# Patient Record
Sex: FEMALE | URBAN - METROPOLITAN AREA
[De-identification: names, ages, dates, MRNs, and addresses within clinical notes are randomized per-mention and may not be internally consistent; named-entity substitution may affect disease eponyms.]

---

## 2024-09-18 ENCOUNTER — OFFICE VISIT (OUTPATIENT)
Dept: URGENT CARE | Age: 54
End: 2024-09-18
Payer: COMMERCIAL

## 2024-09-18 VITALS
DIASTOLIC BLOOD PRESSURE: 75 MMHG | RESPIRATION RATE: 18 BRPM | OXYGEN SATURATION: 97 % | SYSTOLIC BLOOD PRESSURE: 115 MMHG | WEIGHT: 189 LBS | HEART RATE: 71 BPM | TEMPERATURE: 97.7 F | BODY MASS INDEX: 32.27 KG/M2 | HEIGHT: 64 IN

## 2024-09-18 DIAGNOSIS — J02.9 SORE THROAT: ICD-10-CM

## 2024-09-18 DIAGNOSIS — H92.01 RIGHT EAR PAIN: ICD-10-CM

## 2024-09-18 DIAGNOSIS — K04.7 DENTAL ABSCESS: Primary | ICD-10-CM

## 2024-09-18 LAB
POC RAPID STREP: NEGATIVE
POC SARS-COV-2 AG BINAX: NORMAL

## 2024-09-18 RX ORDER — HALOPERIDOL 10 MG/1
10 TABLET ORAL DAILY
COMMUNITY

## 2024-09-18 RX ORDER — RISPERIDONE 4 MG/1
4 TABLET ORAL DAILY
COMMUNITY

## 2024-09-18 RX ORDER — AMOXICILLIN AND CLAVULANATE POTASSIUM 875; 125 MG/1; MG/1
1 TABLET, FILM COATED ORAL 2 TIMES DAILY
Qty: 20 TABLET | Refills: 0 | Status: SHIPPED | OUTPATIENT
Start: 2024-09-18 | End: 2024-09-28

## 2024-09-18 RX ORDER — DIVALPROEX SODIUM 500 MG/1
TABLET, FILM COATED, EXTENDED RELEASE ORAL
COMMUNITY

## 2024-09-18 RX ORDER — SERTRALINE HYDROCHLORIDE 100 MG/1
150 TABLET, FILM COATED ORAL
COMMUNITY
Start: 2023-02-03

## 2024-09-18 RX ORDER — IBUPROFEN 800 MG/1
800 TABLET ORAL 3 TIMES DAILY PRN
Qty: 30 TABLET | Refills: 0 | Status: SHIPPED | OUTPATIENT
Start: 2024-09-18

## 2024-09-18 ASSESSMENT — ENCOUNTER SYMPTOMS
GASTROINTESTINAL NEGATIVE: 1
FEVER: 0
COUGH: 1
SORE THROAT: 1
CHILLS: 0
SHORTNESS OF BREATH: 0

## 2024-09-18 NOTE — PATIENT INSTRUCTIONS
-I have prescribed you Augmentin, an antibiotic, to treat and/or prevent infection of your tooth.  -Pain from dental issues is very hard to control.   --Take 1000 mg of Tylenol every 8 hours scheduled for 24-48 hours   --Take 800 mg ibuprofen (with food!) every 6 hours scheduled for 24-48 hours   --This will reduce but not completely take away the pain:   -Dental abscesses can be serious. You can develop difficulty breathing from swelling of the throat. If your throat begins to swell or you have signs of systemic infection such as fevers, chills or weakness please go to the ER for evaluation.   -Your condition requires evaluation from a specialist. Please see your dentist as soon as possible.  -Go to the ER for worsening symptoms.

## 2024-09-18 NOTE — PROGRESS NOTES
"Subjective   Patient ID: Krystal Mckay is a 54 y.o. female. They present today with a chief complaint of Sore Throat, Cough, and Earache (Patient stated she has had a sore throat and earache for about a week and half.).    History of Present Illness  -endorses right ear pain, sore throat, and right jaw pain   -she has several broken teeth on the right lower jaw and has dental pain   -denies fever, chills, nausea, vomiting, CP, SOB  -denies allergies to antibiotics           Past Medical History  Allergies as of 09/18/2024    (No Known Allergies)       (Not in a hospital admission)       No past medical history on file.    No past surgical history on file.         Review of Systems  Review of Systems   Constitutional:  Negative for chills and fever.   HENT:  Positive for dental problem, ear pain and sore throat.    Respiratory:  Positive for cough. Negative for shortness of breath.    Cardiovascular:  Negative for chest pain.   Gastrointestinal: Negative.    All other systems reviewed and are negative.          Objective    Vitals:    09/18/24 1627   BP: 115/75   Pulse: 71   Resp: 18   Temp: 36.5 °C (97.7 °F)   TempSrc: Oral   SpO2: 97%   Weight: 85.7 kg (189 lb)   Height: 1.626 m (5' 4\")     No LMP recorded.    Physical Exam  Vitals reviewed.   Constitutional:       General: She is not in acute distress.  HENT:      Right Ear: Ear canal normal. No drainage or swelling. There is no impacted cerumen. No mastoid tenderness. Tympanic membrane is injected. Tympanic membrane is not perforated, retracted or bulging.      Left Ear: Tympanic membrane and ear canal normal.      Mouth/Throat:      Dentition: Dental tenderness, dental caries and dental abscesses present.      Pharynx: Uvula midline. Posterior oropharyngeal erythema and postnasal drip present. No pharyngeal swelling or uvula swelling.      Tonsils: No tonsillar exudate or tonsillar abscesses.     Neurological:      Mental Status: She is alert. "       Procedures    Point of Care Test & Imaging Results from this visit  Results for orders placed or performed in visit on 09/18/24   POCT rapid strep A manually resulted   Result Value Ref Range    POC Rapid Strep Negative Negative   POCT Covid-19 Rapid Antigen   Result Value Ref Range    POC SOPHIA-COV-2 AG  Presumptive negative test for SARS-CoV-2 (no antigen detected)     Presumptive negative test for SARS-CoV-2 (no antigen detected)      No results found.    Diagnostic study results (if any) were reviewed by Alison Kearns PA-C.    Assessment/Plan   Allergies, medications, history, and pertinent labs/EKGs/Imaging reviewed by Alison Kearns PA-C.     Medical Decision Making  COVID negative and strep negative.  She has several fractured/broken teeth on the lower right jaw, and suspect ear pain and sore throat 2/2 to a developing dental abscess. Will start Augmentin BID x 10 days, which would also adequately treat both otitis media and strep pharyngitis.  She was advised to follow up with dentist as soon as possible.  Advised Tylenol and NSAIDS PRN.  Rx for ibuprofen 800 mg provided.     At time of discharge patient was clinically well-appearing and HDS for outpatient management. The patient and/or family was educated regarding diagnosis, supportive care, OTC and Rx medications. The patient and/or family was given the opportunity to ask questions prior to discharge.  They verbalized understanding of my discussion of the plans for treatment, expected course, indications to return to  or seek further evaluation in ED, and the need for timely follow up as directed.      Orders and Diagnoses  Diagnoses and all orders for this visit:  Dental abscess  -     amoxicillin-pot clavulanate (Augmentin) 875-125 mg tablet; Take 1 tablet by mouth 2 times a day for 10 days.  -     ibuprofen 800 mg tablet; Take 1 tablet (800 mg) by mouth 3 times a day as needed for mild pain (1 - 3) or moderate pain (4 - 6) (pain).  Sore  throat  -     POCT rapid strep A manually resulted  -     POCT Covid-19 Rapid Antigen  Right ear pain      Medical Admin Record      Patient disposition: Home    Electronically signed by Alison Kearns PA-C  5:54 PM

## 2024-10-24 ENCOUNTER — OFFICE VISIT (OUTPATIENT)
Dept: URGENT CARE | Age: 54
End: 2024-10-24
Payer: COMMERCIAL

## 2024-10-24 ENCOUNTER — ANCILLARY PROCEDURE (OUTPATIENT)
Dept: URGENT CARE | Age: 54
End: 2024-10-24
Payer: COMMERCIAL

## 2024-10-24 VITALS
TEMPERATURE: 97.5 F | OXYGEN SATURATION: 96 % | DIASTOLIC BLOOD PRESSURE: 68 MMHG | HEIGHT: 64 IN | RESPIRATION RATE: 18 BRPM | SYSTOLIC BLOOD PRESSURE: 106 MMHG | WEIGHT: 185 LBS | BODY MASS INDEX: 31.58 KG/M2 | HEART RATE: 82 BPM

## 2024-10-24 DIAGNOSIS — S89.92XA INJURY OF LEFT KNEE, INITIAL ENCOUNTER: Primary | ICD-10-CM

## 2024-10-24 DIAGNOSIS — S80.02XA TRAUMATIC HEMATOMA OF LEFT KNEE, INITIAL ENCOUNTER: ICD-10-CM

## 2024-10-24 DIAGNOSIS — R60.9 SWELLING: ICD-10-CM

## 2024-10-24 NOTE — PROGRESS NOTES
"Subjective   Patient ID: Krystal Mckay is a 54 y.o. female. They present today with a chief complaint of Injury (LT knee).    History of Present Illness  Patient reports that she felt a pop along her left knee as walking down the steps  Doesn't believe her leg hyperextended, believes it was in a flexed position  Notes pain along the outside of her knee along with swelling  Reports that it is very difficult to ambulate    Past Medical History  Allergies as of 10/24/2024    (Not on File)       (Not in a hospital admission)       No past medical history on file.    No past surgical history on file.                                    Objective    Vitals:    10/24/24 1231   BP: 106/68   Pulse: 82   Resp: 18   Temp: 36.4 °C (97.5 °F)   TempSrc: Oral   SpO2: 96%   Weight: 83.9 kg (185 lb)   Height: 1.626 m (5' 4\")     No LMP recorded.    Physical Exam  Constitutional:       General: She is not in acute distress.     Appearance: Normal appearance. She is not toxic-appearing or diaphoretic.   HENT:      Nose: No rhinorrhea.   Eyes:      General: No scleral icterus.        Right eye: No discharge.         Left eye: No discharge.      Extraocular Movements: Extraocular movements intact.   Pulmonary:      Effort: Pulmonary effort is normal.   Musculoskeletal:      Cervical back: Normal range of motion.      Right knee:      Instability Tests: Anterior drawer test negative. Posterior drawer test negative.      Left knee:      Instability Tests: Anterior drawer test negative. Posterior drawer test negative.      Comments: LEFT LOWER EXT  Significant pain with left leg external rotation  Ttp along the lateral joint line  No significant ttp along the medial joint line  No warmth   No significant ttp along the posterior knee  No apparent pain with    Neurological:      Mental Status: She is alert.   Psychiatric:         Mood and Affect: Mood normal.         Behavior: Behavior normal.         Thought Content: Thought content " "normal.      Comments: Pleasant         Procedures    Point of Care Test & Imaging Results from this visit:  Results for orders placed or performed in visit on 09/18/24   POCT rapid strep A manually resulted    Collection Time: 09/18/24  4:32 PM   Result Value Ref Range    POC Rapid Strep Negative Negative   POCT Covid-19 Rapid Antigen    Collection Time: 09/18/24  4:41 PM   Result Value Ref Range    POC SOPHIA-COV-2 AG  Presumptive negative test for SARS-CoV-2 (no antigen detected)     Presumptive negative test for SARS-CoV-2 (no antigen detected)       Diagnostic study results (if any) were reviewed by Wyatt Cleveland MD.    Assessment/Plan   Allergies, medications, history, and pertinent labs/EKGs/Imaging reviewed by Wyatt Cleveland MD.     Medical Decision Making:    Patient's acute lateral knee pain following a pop while walking down the stairs is potentially 2/2 torn lateral mensicus. XR negative for fracture, however, it did reveal an oval hyperdensity posterior to the proximal tibia which could reflect a \"hematoma\". Encourage RICE, NSAIDs PRN, crutches until seen by orthopedics. Given injury clinic information    Orders and Diagnoses  Diagnoses and all orders for this visit:  Injury of left knee, initial encounter  -     XR knee left 3 views; Future  Traumatic hematoma of left knee, initial encounter      Patient disposition: Home      Medical Admin Record      Follow Up Instructions  No follow-ups on file.    Electronically signed by Wyatt Cleveland MD  1:36 PM        "

## 2024-10-24 NOTE — PATIENT INSTRUCTIONS
Your x-ray was negative for fracture, however, it did reveal a likely hematoma behind your knee  Please present to the injury clinic for expert evaluation and possible drainage if applicable   ICE, NSAIDs PRN

## 2024-10-25 ENCOUNTER — APPOINTMENT (OUTPATIENT)
Dept: ORTHOPEDIC SURGERY | Facility: CLINIC | Age: 54
End: 2024-10-25
Payer: COMMERCIAL

## 2024-10-25 ENCOUNTER — OFFICE VISIT (OUTPATIENT)
Dept: ORTHOPEDIC SURGERY | Facility: CLINIC | Age: 54
End: 2024-10-25
Payer: COMMERCIAL

## 2024-10-25 VITALS — BODY MASS INDEX: 31.58 KG/M2 | HEIGHT: 64 IN | WEIGHT: 185 LBS

## 2024-10-25 DIAGNOSIS — M17.12 PATELLOFEMORAL ARTHRITIS OF LEFT KNEE: Primary | ICD-10-CM

## 2024-10-25 DIAGNOSIS — M25.562 ACUTE PAIN OF LEFT KNEE: ICD-10-CM

## 2024-10-25 PROCEDURE — 99213 OFFICE O/P EST LOW 20 MIN: CPT

## 2024-10-25 PROCEDURE — 3008F BODY MASS INDEX DOCD: CPT

## 2024-10-25 PROCEDURE — 99203 OFFICE O/P NEW LOW 30 MIN: CPT

## 2024-10-25 RX ORDER — METHYLPREDNISOLONE 4 MG/1
TABLET ORAL
Qty: 21 TABLET | Refills: 0 | Status: SHIPPED | OUTPATIENT
Start: 2024-10-25

## 2024-10-25 NOTE — PROGRESS NOTES
Subjective    Patient ID: Krystal Mckay is a 54 y.o. female.    Chief Complaint: Pain of the Left Knee    HPI  This is a pleasant 54-year-old female presenting to the office for evaluation of left knee pain, which has been ongoing for months, worsening yesterday when she was walking down the stairs and heard a pop in her knee.  She states that she presented to an urgent care, or multiple view x-rays of her left knee were obtained, without evidence of acute fracture or dislocation.  There was noted to be a soft tissue hyperdensity to posterior tibia, which radiologist advised to correlate with physical exam.  Patient was advised to follow-up with orthopedics.  Presents to the office today stating that her knee does feel better.  She just has a minimal amount of pain, pointing directly to her kneecap.  Pain is worse with any bending or squatting activity.  She has noticed intermittent swelling and limited range of motion due to the pain.  Pain is definitely worse with prolonged standing walking and stair climbing.  Pain is described as a mild dull ache today.  She has not noticed any redness or warmth.  She is not having any mechanical symptoms of knee giving out or buckling.  She has been taking ibuprofen 800 mg and applying ice with minimal relief of symptoms.  Patient denies any posterior leg or calf pain.  She works doing food deliveries.    The patient's past medical, surgical, family, and social history as well as allergies and medications were reviewed and updated in the chart.    Objective   Ortho Exam  Pleasant and no acute distress. Walks with antalgic gait.  Left knee appearing without soft tissue swelling erythema or ecchymosis.  Slight patellofemoral crepitus noted with range of motion testing.  There is no warmth upon touch.  Left knee range of motion is 0-120 °. The knee is stable to varus and valgus stress Lachman and posterior drawer. There is a mild effusion. There is generalized tenderness  surrounding patella.  There is no palpable mass noted upon palpation of posterior knee or calf.  There is no tenderness upon palpation of posterior knee or calf.  No evidence of bruising to this area. Right knee range of motion is 0-120°. There is no effusion. The knee is stable to varus and valgus stress Lachman and posterior drawer. Both lower extremities are well perfused the skin is intact and muscle tone is adequate.    Image Results:  XR knee left 3 views  Narrative: Interpreted By:  Theron Carney,   STUDY:  XR KNEE LEFT 3 VIEWS;  10/24/2024 12:54 pm      INDICATION:  Signs/Symptoms:knee injury.      COMPARISON:  None.      ACCESSION NUMBER(S):  [id number]      ORDERING CLINICIAN:  MARCIE KING      FINDINGS:  No acute fracture or dislocation. Tiny osteophytes are present. There  is a rounded oval soft tissue hyperdensity posterior to the proximal  tibia measuring about 5 cm, only seen on the lateral view.      Impression: No acute osseous injury of the left knee.  Rounded oval hyperdensity posterior to the proximal tibia, which  could reflect hematoma. Suggest correlation with physical exam  findings.      MACRO:  None      Signed by: Theron Carney 10/24/2024 1:01 PM  Dictation workstation:   [id number]    Multiple view x-rays of the left knee obtained yesterday personally reviewed without evidence of acute fracture or dislocation.  My degenerative changes noted patellofemoral compartment with peripheral osteophyte formation noted.  Rounded oval hyperdensity posterior to the proximal tibia, which upon exam was nonsymptomatic.    Assessment/Plan   Encounter Diagnoses:  Patellofemoral arthritis of left knee    Acute pain of left knee    Plan: Discussion with patient in regards to aggravation of patellofemoral arthritis with review of conservative treatment options.  We did discuss a left knee intra-articular steroid injection of Kenalog/lidocaine, but patient deferred stating that she did not want an  injection.  We then discussed an oral Medrol Dosepak to help decrease pain inflammation, which patient agreed to.  She is aware not to take any other NSAIDs while Medrol Dosepak, but can supplement with Tylenol.  She was advised to continue with ice application and topical creams.  She was provided an economy hinged brace today in the office to help with compression and stability.  She can follow-up as needed.    Orders Placed This Encounter    methylPREDNISolone (Medrol Dospak) 4 mg tablets

## 2024-11-05 ENCOUNTER — APPOINTMENT (OUTPATIENT)
Dept: ORTHOPEDIC SURGERY | Facility: CLINIC | Age: 54
End: 2024-11-05
Payer: COMMERCIAL

## 2024-11-08 ENCOUNTER — TELEPHONE (OUTPATIENT)
Dept: ORTHOPEDIC SURGERY | Facility: CLINIC | Age: 54
End: 2024-11-08
Payer: COMMERCIAL

## 2024-11-08 DIAGNOSIS — M25.562 ACUTE PAIN OF LEFT KNEE: Primary | ICD-10-CM

## 2024-11-08 DIAGNOSIS — K04.7 DENTAL ABSCESS: ICD-10-CM

## 2024-11-08 RX ORDER — IBUPROFEN 800 MG/1
800 TABLET ORAL 3 TIMES DAILY PRN
Qty: 30 TABLET | Refills: 0 | Status: SHIPPED | OUTPATIENT
Start: 2024-11-08

## 2024-11-08 NOTE — TELEPHONE ENCOUNTER
Patient would like 800mg ibu sent to her pharmacy for pain         [id number] - [place], [place] - [address] AT [facility]  [address]  [facility] [address]  Phone: [phone] Fax: [phone]

## 2024-11-20 ENCOUNTER — APPOINTMENT (OUTPATIENT)
Dept: ORTHOPEDIC SURGERY | Facility: CLINIC | Age: 54
End: 2024-11-20
Payer: COMMERCIAL

## 2024-11-21 ENCOUNTER — APPOINTMENT (OUTPATIENT)
Dept: RADIOLOGY | Facility: HOSPITAL | Age: 54
End: 2024-11-21
Payer: COMMERCIAL

## 2024-11-21 ENCOUNTER — HOSPITAL ENCOUNTER (EMERGENCY)
Facility: HOSPITAL | Age: 54
Discharge: HOME | End: 2024-11-21
Payer: COMMERCIAL

## 2024-11-21 VITALS
OXYGEN SATURATION: 99 % | DIASTOLIC BLOOD PRESSURE: 59 MMHG | SYSTOLIC BLOOD PRESSURE: 128 MMHG | TEMPERATURE: 98.6 F | RESPIRATION RATE: 20 BRPM | HEART RATE: 70 BPM

## 2024-11-21 DIAGNOSIS — M25.562 ACUTE PAIN OF LEFT KNEE: Primary | ICD-10-CM

## 2024-11-21 PROCEDURE — 73564 X-RAY EXAM KNEE 4 OR MORE: CPT | Mod: LT

## 2024-11-21 PROCEDURE — 73564 X-RAY EXAM KNEE 4 OR MORE: CPT | Mod: LEFT SIDE | Performed by: RADIOLOGY

## 2024-11-21 PROCEDURE — 99283 EMERGENCY DEPT VISIT LOW MDM: CPT

## 2024-11-21 RX ORDER — IBUPROFEN 800 MG/1
800 TABLET ORAL EVERY 8 HOURS PRN
Qty: 15 TABLET | Refills: 0 | Status: SHIPPED | OUTPATIENT
Start: 2024-11-21 | End: 2024-11-26

## 2024-11-21 ASSESSMENT — COLUMBIA-SUICIDE SEVERITY RATING SCALE - C-SSRS
1. IN THE PAST MONTH, HAVE YOU WISHED YOU WERE DEAD OR WISHED YOU COULD GO TO SLEEP AND NOT WAKE UP?: NO
2. HAVE YOU ACTUALLY HAD ANY THOUGHTS OF KILLING YOURSELF?: NO
6. HAVE YOU EVER DONE ANYTHING, STARTED TO DO ANYTHING, OR PREPARED TO DO ANYTHING TO END YOUR LIFE?: NO

## 2024-11-21 NOTE — ED TRIAGE NOTES
Patient arrives from home with complaints of left knee pain that has been ongoing for about a month that occurred from walking down the steps and hearing a pop.  Patient states she was seen by an orthopedics and was supposed to have an appointment yesterday but missed it.

## 2024-11-21 NOTE — ED PROVIDER NOTES
Limitations to History: none  External Records Reviewed  Independent Historians: self  Social determinants affecting care: none    HPI  Krystal Mckay is a 54 y.o. female who presents to the emergency department for assessment of left knee pain for about a month.  She reports that she followed with Orthopedics for this and was told that she just had an injury and she was to rest and ice the leg.  She reports that pain started returning again to the left leg.  She made a new appointment with orthopedics however accidentally missed it yesterday.  Moving the knee makes her pain worse.  She currently has no pain if she took ibuprofen prior to the ER visit.  She has been using heat with relief as well as ibuprofen with relief.  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  She has not had new trauma to the leg.  She has not had fever or chills.  She has no further complaints.    PMH  No past medical history on file. reviewed by myself.    Meds  Current Outpatient Medications   Medication Instructions    divalproex (Depakote ER) 500 mg 24 hr tablet TAKE 2 TABLETS BY MOUTH NIGHTLY AT BEDTIME.    haloperidol (HALDOL) 10 mg, Daily    ibuprofen 800 mg, oral, 3 times daily PRN    methylPREDNISolone (Medrol Dospak) 4 mg tablets Use as directed by package instructions    risperiDONE (RISPERDAL) 4 mg, Daily    sertraline (ZOLOFT) 150 mg, Daily RT       Allergies  No Known Allergies reviewed by myself.    SHx  Social History     Tobacco Use    Smoking status: Unknown   Vaping Use    Vaping status: Never Used   Substance Use Topics    Drug use: Never    reviewed by myself.      ------------------------------------------------------------------------------------------------------------------------------------------    There were no vitals taken for this visit.    Physical Exam  Vitals and nursing note reviewed.   Constitutional:       Appearance: Normal appearance.  She is normal weight.   HENT:      Head: Atraumatic.      Nose: Nose normal.      Mouth/Throat:      Mouth: Mucous membranes are moist.   Eyes:      Extraocular Movements: Extraocular movements intact.      Conjunctiva/sclera: Conjunctivae normal.   Cardiovascular:      Rate and Rhythm: Normal rate and regular rhythm.   Pulmonary:      Effort: Pulmonary effort is normal.      Breath sounds: Normal breath sounds.   Musculoskeletal:      Cervical back: Neck supple.      Left hip: No deformity or bony tenderness. Normal range of motion.      Left knee: No swelling, erythema, ecchymosis or bony tenderness. Normal range of motion. Normal pulse.      Left ankle: Normal.      Left foot: Normal. Normal capillary refill. Normal pulse.      Comments:  Compartments are soft and compressible to the left lower extremity   Skin:     General: Skin is warm and dry.      Capillary Refill: Capillary refill takes less than 2 seconds.   Neurological:      Mental Status: She is alert and oriented to person, place, and time.          ------------------------------------------------------------------------------------------------------------------------------------------  Labs  Labs Reviewed - No data to display     Imaging  XR knee left 4+ views   Final Result   No acute fracture or dislocation.        Small suprapatellar effusion.        Spurring of the patella and tibial spines        MACRO:   None        Signed by: Ashley Campos 11/21/2024 8:58 AM   Dictation workstation:   [id number]           ED Course  Diagnoses as of 11/21/24 0918   Acute pain of left knee        Medical Decision Making: She did not appear ill or toxic.  Vital signs reviewed and stable.  Will repeat x-ray imaging.  She currently does not have any pain due to taking ibuprofen prior to ER visit.  Her left lower extremity is neurovascularly intact.  Her compartments are soft and compressible.     Differential diagnoses considered:  Knee contusion, knee sprain, knee  strain, joint effusion, others    X-ray of the knee showing no acute fracture or dislocation.  There is a small suprapatellar effusion but otherwise unremarkable.  I discussed with the patient about the imaging.  I recommend she continue taking the ibuprofen with food to help with her pain.  She is to keep her orthopedic appointment for next week and follow-up.  I recommend she rest, ice, elevate the leg.  She reports that she has been using heat and this does seem to help so she will continue with this.  She is to return to the ER immediately if symptoms change or worsen.  She verbalized understanding and agreed to plan of care.  She was discharged home in stable condition.    Diagnosis: left knee pain  Plan: discharge     Melecio Watkins PA-C  11/21/24 0918

## 2024-11-21 NOTE — DISCHARGE INSTRUCTIONS
Please follow-up with orthopedics as scheduled next week.  Rest, ice, elevate your leg due to injury.  Tylenol and ibuprofen for pain control.  Return to ER if symptoms change or worsen.

## 2024-11-27 ENCOUNTER — OFFICE VISIT (OUTPATIENT)
Dept: ORTHOPEDIC SURGERY | Facility: CLINIC | Age: 54
End: 2024-11-27
Payer: COMMERCIAL

## 2024-11-27 ENCOUNTER — HOSPITAL ENCOUNTER (OUTPATIENT)
Dept: RADIOLOGY | Facility: EXTERNAL LOCATION | Age: 54
Discharge: HOME | End: 2024-11-27

## 2024-11-27 DIAGNOSIS — M25.562 ACUTE PAIN OF LEFT KNEE: ICD-10-CM

## 2024-11-27 DIAGNOSIS — M17.12 PATELLOFEMORAL ARTHRITIS OF LEFT KNEE: Primary | ICD-10-CM

## 2024-11-27 DIAGNOSIS — M25.462 EFFUSION OF LEFT KNEE: ICD-10-CM

## 2024-11-27 PROCEDURE — 2500000004 HC RX 250 GENERAL PHARMACY W/ HCPCS (ALT 636 FOR OP/ED): Performed by: FAMILY MEDICINE

## 2024-11-27 PROCEDURE — 99214 OFFICE O/P EST MOD 30 MIN: CPT | Performed by: FAMILY MEDICINE

## 2024-11-27 PROCEDURE — 20611 DRAIN/INJ JOINT/BURSA W/US: CPT | Mod: LT | Performed by: FAMILY MEDICINE

## 2024-11-27 RX ORDER — LIDOCAINE HYDROCHLORIDE 20 MG/ML
5 INJECTION, SOLUTION INFILTRATION; PERINEURAL
Status: COMPLETED | OUTPATIENT
Start: 2024-11-27 | End: 2024-11-27

## 2024-11-27 RX ORDER — TRIAMCINOLONE ACETONIDE 40 MG/ML
40 INJECTION, SUSPENSION INTRA-ARTICULAR; INTRAMUSCULAR
Status: COMPLETED | OUTPATIENT
Start: 2024-11-27 | End: 2024-11-27

## 2024-11-27 NOTE — PROGRESS NOTES
History of Present Illness   Chief Complaint   Patient presents with    Left Knee - Follow-up       The patient is 54 y.o. female with past medical history of bipolar schizophrenia here for follow-up of left knee pain.  Patient was seen by my [facility] colleague, Thi Nguyen.  In brief patient been dealing with some more mild intermittent knee pain for a few months however last month was walking down some stairs when she felt a popping sensation in her knee.  At that time she was having some focal pain over the anterior aspect of her knee, symptoms thought to be secondary to aggravation of underlying mild patellofemoral osteoarthritis which was seen on x-ray.  Patient preferred conservative management, she was given some oral prednisone and a knee stabilizing brace.  She had ongoing pain despite conservative management, she was seen in the emergency department last week, they recommended outpatient orthopedic follow-up, repeat x-rays at that time showed no new injuries but did show small joint effusion.  She has pain with walking, she feels like brace is making symptoms more problematic.  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  She has a hard time going up and down stairs.  She has been taking ibuprofen 800 mg as needed for pain, on average a few times a week.  She denies a history of any significant knee problems in the past.      No past medical history on file.    Medication Documentation Review Audit       Reviewed by VENKAT Caban (Nurse Practitioner) on 10/25/24 at 1335      Medication Order Taking? Sig Documenting Provider Last Dose Status   divalproex (Depakote ER) 500 mg 24 hr tablet [id number] Yes TAKE 2 TABLETS BY MOUTH NIGHTLY AT BEDTIME. Historical Provider, MD Taking Active   haloperidol (Haldol) 10 mg tablet [id number] Yes Take 1 tablet (10 mg) by mouth once daily. Historical Provider, MD Taking Active   ibuprofen 800 mg tablet [id number]  Take 1 tablet  (800 mg) by mouth 3 times a day as needed for mild pain (1 - 3) or moderate pain (4 - 6) (pain). Alison Kearns PA-C  Active   methylPREDNISolone (Medrol Dospak) 4 mg tablets [id number]  Use as directed by package instructions Thi Nguyen, APRN-CNP  Active   risperiDONE (RisperDAL) 4 mg tablet [id number] Yes Take 1 tablet (4 mg) by mouth once daily. Historical Provider, MD Taking Active   sertraline (Zoloft) 100 mg tablet [id number] Yes Take 1.5 tablets (150 mg) by mouth once daily. Historical Provider, MD Taking Active                    No Known Allergies    Social History     Socioeconomic History    Marital status: Unknown     Spouse name: Not on file    Number of children: Not on file    Years of education: Not on file    Highest education level: Not on file   Occupational History    Not on file   Tobacco Use    Smoking status: Unknown    Smokeless tobacco: Not on file   Vaping Use    Vaping status: Never Used   Substance and Sexual Activity    Alcohol use: Not on file    Drug use: Never    Sexual activity: Not on file   Other Topics Concern    Not on file   Social History Narrative    Not on file     Social Drivers of Health     Financial Resource Strain: Not on File (8/19/2019)    Received from [facility]    Financial Resource Strain     Financial Resource Strain: 0   Food Insecurity: Not on File (9/26/2024)    Received from [facility]    Food Insecurity     Food: 0   Transportation Needs: Not on File (8/19/2019)    Received from [facility]    Transportation Needs     Transportation: 0   Physical Activity: Not on File (8/19/2019)    Received from [facility]    Physical Activity     Physical Activity: 0   Stress: Not on File (8/19/2019)    Received from [facility]    Stress     Stress: 0   Social Connections: Not on File (9/13/2024)    Received from [facility]    Social Connections     Connectedness: 0   Intimate Partner Violence: Not on file   Housing Stability: Not on File (8/19/2019)    Received from [facility]    Housing Stability      Housin       No past surgical history on file.       Review of Systems   GENERAL: Negative  GI: Negative  MUSCULOSKELETAL: See HPI  SKIN: Negative  NEURO:  Negative     Physical Exam:    General/Constitutional: well appearing, no distress, appears stated age  HEENT: sclera clear  Respiratory: non labored breathing  Vascular: No edema, swelling or tenderness, except as noted in detailed exam.  Integumentary: No impressive skin lesions present, except as noted in detailed exam.  Neurological:  Alert and oriented   Psychological:  Normal mood and affect.  Musculoskeletal: Normal, except as noted in detailed exam and in HPI mild antalgic gait, unassisted    Left knee: Normal appearance, no swelling, no redness warmth.  There is a moderate joint effusion.  She does have some medial and lateral joint line tenderness to palpation, medial greater than lateral.  Range of motion from 3 to 115 degrees with pain at end range of flexion.  No motor deficits present at the knee.  There is some pain with Mirtha testing.  Negative Lachman, negative posterior drawer.  Stable to varus and valgus stress.       Imaging: No new imaging today, previous x-ray showed some mild patellofemoral osteoarthritis and small joint effusion.    L Inj/Asp: L knee on 2024 1:53 PM  Indications: pain  Details: 22 G needle, ultrasound-guided superolateral approach  Medications: 40 mg triamcinolone acetonide 40 mg/mL; 5 mL lidocaine 20 mg/mL (2 %)  Aspirate: 45 mL clear and yellow  Outcome: tolerated well, no immediate complications  Procedure, treatment alternatives, risks and benefits explained, specific risks discussed. Consent was given by the patient. Immediately prior to procedure a time out was called to verify the correct patient, procedure, equipment, support staff and site/side marked as required. Patient was prepped and draped in the usual sterile fashion.               Assessment   1. Patellofemoral arthritis of left knee  Point of  Care Ultrasound      2. Acute pain of left knee        3. Effusion of left knee              Plan: Subacute left knee pain, does have notable joint effusion on exam today.  Differential includes aggravation of underlying osteoarthritis, did have popping sensation walking down some stairs, there is some medial joint line tenderness, discussed possibility of medial meniscus tear as cause of her symptoms.  Discussed further workup and treatment with patient.  We did proceed with ultrasound-guided left knee aspiration followed by Kenalog injection, 45 cc of normal-appearing joint fluid was aspirated prior to injection.  Patient tolerated without issue, see procedure note for full details, she did admit to improvement in symptoms following procedure.  I would like to see her back in 1 month for reassessment.  We discussed if she has any recurrent or ongoing pain could consider MRI for further evaluation of possible medial meniscus tear.

## 2024-12-10 ENCOUNTER — TELEPHONE (OUTPATIENT)
Dept: ORTHOPEDIC SURGERY | Facility: CLINIC | Age: 54
End: 2024-12-10
Payer: COMMERCIAL

## 2024-12-10 DIAGNOSIS — M17.12 PATELLOFEMORAL ARTHRITIS OF LEFT KNEE: Primary | ICD-10-CM

## 2024-12-10 NOTE — TELEPHONE ENCOUNTER
Patient calling in regarding her knee pain, states that it was feeling okay while the weather was nice but now its really painful again.   Wants to know if there is a brace that would keep her knee WARM as its too cold and she is unable to work.     Also wanting to know if she should be doing physical therapy or what the next steps are.

## 2024-12-10 NOTE — TELEPHONE ENCOUNTER
I put an order for physical therapy into the chart, if she plans to do at  order is there.  If she wants to do PT outside of  we can fax to location she would like to go.  No specific brace that I would recommend specifically for warmth.  She can use any over-the-counter brace that she feels is supportive for her knee.

## 2025-01-06 ENCOUNTER — APPOINTMENT (OUTPATIENT)
Dept: PHYSICAL THERAPY | Facility: CLINIC | Age: 55
End: 2025-01-06
Payer: COMMERCIAL

## 2025-01-08 ENCOUNTER — OFFICE VISIT (OUTPATIENT)
Dept: ORTHOPEDIC SURGERY | Facility: CLINIC | Age: 55
End: 2025-01-08
Payer: COMMERCIAL

## 2025-01-08 VITALS — HEIGHT: 64 IN | WEIGHT: 187 LBS | BODY MASS INDEX: 31.92 KG/M2

## 2025-01-08 DIAGNOSIS — M25.562 ACUTE PAIN OF LEFT KNEE: ICD-10-CM

## 2025-01-08 DIAGNOSIS — M17.12 PATELLOFEMORAL ARTHRITIS OF LEFT KNEE: ICD-10-CM

## 2025-01-08 PROCEDURE — 99214 OFFICE O/P EST MOD 30 MIN: CPT | Performed by: FAMILY MEDICINE

## 2025-01-08 PROCEDURE — 3008F BODY MASS INDEX DOCD: CPT | Performed by: FAMILY MEDICINE

## 2025-01-08 NOTE — PROGRESS NOTES
History of Present Illness   Chief Complaint   Patient presents with    Left Knee - Follow-up       The patient is 54 y.o. female with past medical history of bipolar schizophrenia here for follow-up of left knee pain.  Patient was seen by me initially 6 weeks ago, see previous note for full details.  In brief patient had been dealing with some intermittent knee pain over the past several months.  Did see my nurse practitioner colleague, was given a knee brace, some oral medications and a home exercise program that she had been doing.  She had some ongoing pain and swelling prompting her to follow up in the office with me.  At that time there was a notable joint effusion, x-rays prior to that showed some mild degenerative changes, she was treated with knee aspiration followed by Kenalog injection.  Patient says that she did feel good for around 1 to 2 weeks but then had recurrence of pain, was having some difficulty walking/bearing weight, says that she has been resting for the past 2 weeks with limited walking and has seen some mild improvement but still having pain, admits to some feelings of instability with knee giving out as well as some locking of the knee.  She feels like there is some recurrent swelling in the knee.  She continues to have difficulty walking up stairs.  She is taking over-the-counter ibuprofen as needed for pain.        No past medical history on file.    Medication Documentation Review Audit       Reviewed by Álvaro Choe MD (Physician) on 11/27/24 at 1354      Medication Order Taking? Sig Documenting Provider Last Dose Status   divalproex (Depakote ER) 500 mg 24 hr tablet [id number] No TAKE 2 TABLETS BY MOUTH NIGHTLY AT BEDTIME. Historical Provider, MD Taking Active   haloperidol (Haldol) 10 mg tablet [id number] No Take 1 tablet (10 mg) by mouth once daily. Historical Provider, MD Taking Active     Discontinued 11/21/24 0944   ibuprofen 800 mg tablet [id number]  Take 1 tablet (800 mg) by  mouth every 8 hours if needed for mild pain (1 - 3) for up to 5 days. Melecio Watkins PA-C   24 [id number]   methylPREDNISolone (Medrol Dospak) 4 mg tablets [id number]  Use as directed by package instructions Thi Nguyen, APRN-CNP  Active   risperiDONE (RisperDAL) 4 mg tablet [id number] No Take 1 tablet (4 mg) by mouth once daily. Historical Provider, MD Taking Active   sertraline (Zoloft) 100 mg tablet [id number] No Take 1.5 tablets (150 mg) by mouth once daily. Historical Provider, MD Taking Active                    Allergies   Allergen Reactions    Nickel Rash       Social History     Socioeconomic History    Marital status: Unknown     Spouse name: Not on file    Number of children: Not on file    Years of education: Not on file    Highest education level: Not on file   Occupational History    Not on file   Tobacco Use    Smoking status: Unknown    Smokeless tobacco: Not on file   Vaping Use    Vaping status: Never Used   Substance and Sexual Activity    Alcohol use: Never    Drug use: Never    Sexual activity: Not on file   Other Topics Concern    Not on file   Social History Narrative    Not on file     Social Drivers of Health     Financial Resource Strain: Not on File (2019)    Received from [facility]    Financial Resource Strain     Financial Resource Strain: 0   Food Insecurity: Not on File (2024)    Received from [facility]    Food Insecurity     Food: 0   Transportation Needs: Not on File (2019)    Received from [facility]    Transportation Needs     Transportation: 0   Physical Activity: Not on File (2019)    Received from [facility]    Physical Activity     Physical Activity: 0   Stress: Not on File (2019)    Received from [facility]    Stress     Stress: 0   Social Connections: Not on File (2024)    Received from [facility]    Social Connections     Connectedness: 0   Intimate Partner Violence: Not on file   Housing Stability: Not on File (2019)    Received from [facility]    Housing  Stability     Housin       No past surgical history on file.       Review of Systems   GENERAL: Negative  GI: Negative  MUSCULOSKELETAL: See HPI  SKIN: Negative  NEURO:  Negative     Physical Exam:    General/Constitutional: well appearing, no distress, appears stated age  HEENT: sclera clear  Respiratory: non labored breathing  Vascular: No edema, swelling or tenderness, except as noted in detailed exam.  Integumentary: No impressive skin lesions present, except as noted in detailed exam.  Neurological:  Alert and oriented   Psychological:  Normal mood and affect.  Musculoskeletal: Normal, except as noted in detailed exam and in HPI mild antalgic gait, unassisted    Left knee: Normal appearance, no swelling, no redness warmth.  There is a trace to small joint effusion.  She does have some medial and lateral joint line tenderness to palpation, medial greater than lateral.  Range of motion from 3 to 115 degrees with pain at end range of flexion.  No motor deficits present at the knee.  There is some pain with Mirtha testing.  Negative Lachman, negative posterior drawer.  Stable to varus and valgus stress.       Imaging: No new imaging today, previous x-ray showed some mild patellofemoral osteoarthritis and small joint effusion.          Assessment   1. Acute pain of left knee  MR knee left wo IV contrast      2. Patellofemoral arthritis of left knee  MR knee left wo IV contrast              Plan: Subacute left knee pain, recurrent joint effusion.  X-ray showed to minimal degenerative changes, is complaining of some instability and locking, there is concern for possible medial meniscus tear as cause of symptoms.  She has had prior aspiration, injection, bracing, over-the-counter, prescription medications as well as home exercise rehabilitation with ongoing symptoms.  Given this, specifically with her mechanical symptoms I would like to obtain an MRI of her left knee to evaluate for possible medial meniscus tear,  other derangement that may benefit from possible surgical intervention.  In the meantime she will continue with conservative management.  Further treatment pending MRI results.

## 2025-01-15 ENCOUNTER — EVALUATION (OUTPATIENT)
Dept: PHYSICAL THERAPY | Facility: CLINIC | Age: 55
End: 2025-01-15
Payer: COMMERCIAL

## 2025-01-15 ENCOUNTER — OFFICE VISIT (OUTPATIENT)
Dept: ORTHOPEDIC SURGERY | Facility: CLINIC | Age: 55
End: 2025-01-15
Payer: COMMERCIAL

## 2025-01-15 ENCOUNTER — HOSPITAL ENCOUNTER (OUTPATIENT)
Dept: RADIOLOGY | Facility: CLINIC | Age: 55
Discharge: HOME | End: 2025-01-15
Payer: COMMERCIAL

## 2025-01-15 DIAGNOSIS — M25.532 LEFT WRIST PAIN: ICD-10-CM

## 2025-01-15 DIAGNOSIS — M17.12 PATELLOFEMORAL ARTHRITIS OF LEFT KNEE: ICD-10-CM

## 2025-01-15 DIAGNOSIS — M77.8 LEFT WRIST TENDINITIS: Primary | ICD-10-CM

## 2025-01-15 PROCEDURE — 97161 PT EVAL LOW COMPLEX 20 MIN: CPT | Mod: GP

## 2025-01-15 PROCEDURE — 73100 X-RAY EXAM OF WRIST: CPT | Mod: LEFT SIDE | Performed by: RADIOLOGY

## 2025-01-15 PROCEDURE — 99213 OFFICE O/P EST LOW 20 MIN: CPT | Performed by: FAMILY MEDICINE

## 2025-01-15 PROCEDURE — 73100 X-RAY EXAM OF WRIST: CPT | Mod: LT

## 2025-01-15 PROCEDURE — 97110 THERAPEUTIC EXERCISES: CPT | Mod: GP

## 2025-01-15 ASSESSMENT — PATIENT HEALTH QUESTIONNAIRE - PHQ9
1. LITTLE INTEREST OR PLEASURE IN DOING THINGS: NOT AT ALL
2. FEELING DOWN, DEPRESSED OR HOPELESS: NOT AT ALL
SUM OF ALL RESPONSES TO PHQ9 QUESTIONS 1 AND 2: 0

## 2025-01-15 ASSESSMENT — COLUMBIA-SUICIDE SEVERITY RATING SCALE - C-SSRS
1. IN THE PAST MONTH, HAVE YOU WISHED YOU WERE DEAD OR WISHED YOU COULD GO TO SLEEP AND NOT WAKE UP?: NO
6. HAVE YOU EVER DONE ANYTHING, STARTED TO DO ANYTHING, OR PREPARED TO DO ANYTHING TO END YOUR LIFE?: NO
2. HAVE YOU ACTUALLY HAD ANY THOUGHTS OF KILLING YOURSELF?: NO

## 2025-01-15 NOTE — PROGRESS NOTES
History of Present Illness   Chief Complaint   Patient presents with    Left Wrist - Pain     OPNP L WRIST PAIN X 3/4 WEEKS NKI       The patient is 54 y.o. right-hand-dominant female  here with a complaint of left wrist pain.  Onset of symptoms over the past 3 weeks or so, atraumatic in nature.  She points to the dorsal aspect of her wrist as area of discomfort, she has some pain with wrist motion, pushing herself up from a seated position.  She denies any swelling, bruising, skin changes.  No specific treatments for her wrist, she denies any history of any significant wrist problems in the past.  She denies any numbness or tingling.    History reviewed. No pertinent past medical history.    Medication Documentation Review Audit       Reviewed by Nba Castillo MA (Medical Assistant) on 01/15/25 at 1503      Medication Order Taking? Sig Documenting Provider Last Dose Status   divalproex (Depakote ER) 500 mg 24 hr tablet [id number] No TAKE 2 TABLETS BY MOUTH NIGHTLY AT BEDTIME. Historical Provider, MD Taking Active   haloperidol (Haldol) 10 mg tablet [id number] No Take 1 tablet (10 mg) by mouth once daily. Historical Provider, MD Taking Active   methylPREDNISolone (Medrol Dospak) 4 mg tablets [id number]  Use as directed by package instructions Thi Juarezec, APRN-CNP  Active   risperiDONE (RisperDAL) 4 mg tablet [id number] No Take 1 tablet (4 mg) by mouth once daily. Historical Provider, MD Taking Active   sertraline (Zoloft) 100 mg tablet [id number] No Take 1.5 tablets (150 mg) by mouth once daily. Historical Provider, MD Taking Active                    Allergies   Allergen Reactions    Nickel Rash       Social History     Socioeconomic History    Marital status:      Spouse name: Not on file    Number of children: Not on file    Years of education: Not on file    Highest education level: Not on file   Occupational History    Not on file   Tobacco Use    Smoking status: Unknown    Smokeless  tobacco: Not on file   Vaping Use    Vaping status: Never Used   Substance and Sexual Activity    Alcohol use: Never    Drug use: Never    Sexual activity: Not on file   Other Topics Concern    Not on file   Social History Narrative    Not on file     Social Drivers of Health     Financial Resource Strain: Not on File (2019)    Received from [facility] (acquired by [facility] in 2014)    Financial Resource Strain     Financial Resource Strain: 0   Food Insecurity: Not on File (2024)    Received from [facility] (acquired by [facility] in 2014)    Food Insecurity     Food: 0   Transportation Needs: Not on File (2019)    Received from [facility] (acquired by [facility] in 2014)    Transportation Needs     Transportation: 0   Physical Activity: Not on File (2019)    Received from [facility] (acquired by [facility] in 2014)    Physical Activity     Physical Activity: 0   Stress: Not on File (2019)    Received from [facility] (acquired by [facility] in 2014)    Stress     Stress: 0   Social Connections: Not on File (2024)    Received from [facility] (acquired by [facility] in 2014)    Social Connections     Connectedness: 0   Intimate Partner Violence: Not on file   Housing Stability: Not on File (2019)    Received from [facility] (acquired by [facility] in 2014)    Housing Stability     Housin       History reviewed. No pertinent surgical history.       Review of Systems   GENERAL: Negative  GI: Negative  MUSCULOSKELETAL: See HPI  SKIN: Negative  NEURO:  Negative     Physical Exam:    General/Constitutional: well appearing, no distress, appears stated age  HEENT: sclera clear  Respiratory: non labored breathing  Vascular: No edema, swelling or tenderness, except as noted in detailed exam.  Integumentary: No impressive skin lesions present, except as noted in detailed exam.  Neurological:  Alert and oriented   Psychological:  Normal mood and affect.  Musculoskeletal: Normal, except as noted in detailed exam and in HPI    Left wrist: Normal appearance, no swelling, no redness or warmth, no other skin changes.  There is some mild tenderness at the dorsum of the wrist near the radiocarpal joint, no other areas of tenderness to palpation.  She has  full range of motion at the wrist, she admits to pain at endrange of flexion and extension.  There is pain but no weakness with resisted wrist extension.  No other motor deficits are present at the wrist.  No instability at the DRUJ, at the hand there is no motor or sensory deficits of the median, ulnar, radial nerve distributions.       Imaging: X-rays of left wrist obtained today and independently reviewed.  No acute abnormalities are seen, no significant degenerative changes are present      Assessment   1. Left wrist tendinitis  Wrist brace      2. Left wrist pain  XR wrist left 1-2 views            Plan: Left wrist pain consistent with mild left wrist tendinitis of the extensor compartment.  Recommending conservative management, she was fitted for a Futuro wrist brace today, she can wear for comfort as needed over the next few weeks, she can wean out of brace as symptoms allow, she can use over-the-counter medications as needed.  She will follow-up as symptoms dictate.
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
## 2025-01-22 ENCOUNTER — APPOINTMENT (OUTPATIENT)
Dept: PHYSICAL THERAPY | Facility: CLINIC | Age: 55
End: 2025-01-22
Payer: COMMERCIAL

## 2025-01-29 ENCOUNTER — APPOINTMENT (OUTPATIENT)
Dept: ORTHOPEDIC SURGERY | Facility: CLINIC | Age: 55
End: 2025-01-29
Payer: COMMERCIAL

## 2025-01-29 ENCOUNTER — APPOINTMENT (OUTPATIENT)
Dept: PHYSICAL THERAPY | Facility: CLINIC | Age: 55
End: 2025-01-29
Payer: COMMERCIAL

## 2025-01-31 ENCOUNTER — APPOINTMENT (OUTPATIENT)
Dept: RADIOLOGY | Facility: CLINIC | Age: 55
End: 2025-01-31
Payer: COMMERCIAL

## 2025-02-03 ENCOUNTER — DOCUMENTATION (OUTPATIENT)
Dept: PHYSICAL THERAPY | Facility: CLINIC | Age: 55
End: 2025-02-03
Payer: COMMERCIAL

## 2025-02-03 NOTE — PROGRESS NOTES
Physical Therapy                 Therapy Communication Note    Patient Name: Krystal Mckay  MRN: [id number]  Department:   Room: Room/bed info not found  Today's Date: 2/3/2025     Discipline: Physical Therapy          Missed Visit Reason:      Missed Time: Cancel    Comment: Pt unable to attend PT due to cost. Will cont with HEP

## 2025-02-05 ENCOUNTER — APPOINTMENT (OUTPATIENT)
Dept: PHYSICAL THERAPY | Facility: CLINIC | Age: 55
End: 2025-02-05
Payer: COMMERCIAL

## 2025-02-11 ENCOUNTER — APPOINTMENT (OUTPATIENT)
Dept: RADIOLOGY | Facility: HOSPITAL | Age: 55
End: 2025-02-11
Payer: COMMERCIAL

## 2025-02-12 ENCOUNTER — APPOINTMENT (OUTPATIENT)
Dept: PHYSICAL THERAPY | Facility: CLINIC | Age: 55
End: 2025-02-12
Payer: COMMERCIAL

## 2025-02-17 ENCOUNTER — HOSPITAL ENCOUNTER (OUTPATIENT)
Dept: RADIOLOGY | Facility: HOSPITAL | Age: 55
Discharge: HOME | End: 2025-02-17
Payer: COMMERCIAL

## 2025-02-17 DIAGNOSIS — M17.12 PATELLOFEMORAL ARTHRITIS OF LEFT KNEE: ICD-10-CM

## 2025-02-17 DIAGNOSIS — M25.562 ACUTE PAIN OF LEFT KNEE: ICD-10-CM

## 2025-02-17 PROCEDURE — 73721 MRI JNT OF LWR EXTRE W/O DYE: CPT | Mod: LEFT SIDE | Performed by: RADIOLOGY

## 2025-02-17 PROCEDURE — 73721 MRI JNT OF LWR EXTRE W/O DYE: CPT | Mod: LT

## 2025-02-19 ENCOUNTER — OFFICE VISIT (OUTPATIENT)
Dept: ORTHOPEDIC SURGERY | Facility: CLINIC | Age: 55
End: 2025-02-19
Payer: COMMERCIAL

## 2025-02-19 ENCOUNTER — APPOINTMENT (OUTPATIENT)
Dept: PHYSICAL THERAPY | Facility: CLINIC | Age: 55
End: 2025-02-19
Payer: COMMERCIAL

## 2025-02-19 DIAGNOSIS — M17.12 ARTHRITIS OF LEFT KNEE: ICD-10-CM

## 2025-02-19 DIAGNOSIS — M84.452D: Primary | ICD-10-CM

## 2025-02-19 PROCEDURE — 99214 OFFICE O/P EST MOD 30 MIN: CPT | Performed by: FAMILY MEDICINE

## 2025-02-19 RX ORDER — MELOXICAM 15 MG/1
15 TABLET ORAL DAILY
Qty: 30 TABLET | Refills: 0 | Status: SHIPPED | OUTPATIENT
Start: 2025-02-19 | End: 2025-03-21

## 2025-02-19 NOTE — PROGRESS NOTES
History of Present Illness   Chief Complaint   Patient presents with    Left Knee - Follow-up     MRI DISCUSSION       The patient is 54 y.o. female with past medical history of bipolar schizophrenia here for follow-up of left knee pain/MRI review.  Patient was last seen by me 6 weeks ago, see previous note for full details.  In brief patient had been dealing with some intermittent knee pain over the past several months.  Did see my nurse practitioner colleague, was given a knee brace, some oral medications and a home exercise program that she had been doing.  She had some ongoing pain and swelling prompting her to follow up in the office with me.  At that time there was a notable joint effusion, x-rays prior to that showed some mild degenerative changes, she was treated with knee aspiration followed by Kenalog injection.  Patient says that she did feel good for around 1 to 2 weeks but then had recurrence of pain, was having some difficulty walking/bearing weight, this pain did improve with limited walking, resting over a few weeks.  At her most recent follow-up with me 6 weeks ago she still had residual joint effusion, there is some pain at the medial aspect of the knee, we opted for MRI further evaluation of symptoms given ongoing pain, swelling/joint effusion, minimal degenerative changes seen on x-ray.  She is here today for review.  Of note she says that symptoms have been waxing and waning over the past 6 weeks, reached out to me debating canceling her MRI because she was feeling well but ultimately had it done.  Patient had the flu last week and had been resting and says that her pain is feeling pretty good currently, feels like there is still some swelling and fullness in the knee.            No past medical history on file.    Medication Documentation Review Audit       Reviewed by Nba Castillo MA (Medical Assistant) on 01/15/25 at 1503      Medication Order Taking? Sig Documenting Provider  Last Dose Status   divalproex (Depakote ER) 500 mg 24 hr tablet [id number] No TAKE 2 TABLETS BY MOUTH NIGHTLY AT BEDTIME. Historical Provider, MD Taking Active   haloperidol (Haldol) 10 mg tablet [id number] No Take 1 tablet (10 mg) by mouth once daily. Historical Provider, MD Taking Active   methylPREDNISolone (Medrol Dospak) 4 mg tablets [id number]  Use as directed by package instructions Thi ROGERS Detec, APRN-CNP  Active   risperiDONE (RisperDAL) 4 mg tablet [id number] No Take 1 tablet (4 mg) by mouth once daily. Historical Provider, MD Taking Active   sertraline (Zoloft) 100 mg tablet [id number] No Take 1.5 tablets (150 mg) by mouth once daily. Historical Provider, MD Taking Active                    Allergies   Allergen Reactions    Nickel Rash       Social History     Socioeconomic History    Marital status:      Spouse name: Not on file    Number of children: Not on file    Years of education: Not on file    Highest education level: Not on file   Occupational History    Not on file   Tobacco Use    Smoking status: Unknown    Smokeless tobacco: Not on file   Vaping Use    Vaping status: Never Used   Substance and Sexual Activity    Alcohol use: Never    Drug use: Never    Sexual activity: Not on file   Other Topics Concern    Not on file   Social History Narrative    Not on file     Social Drivers of Health     Financial Resource Strain: Not on File (8/19/2019)    Received from [facility]    Financial Resource Strain     Financial Resource Strain: 0   Food Insecurity: Not on File (9/26/2024)    Received from [facility]    Food Insecurity     Food: 0   Transportation Needs: Not on File (8/19/2019)    Received from [facility]    Transportation Needs     Transportation: 0   Physical Activity: Not on File (8/19/2019)    Received from [facility]    Physical Activity     Physical Activity: 0   Stress: Not on File (8/19/2019)    Received from [facility]    Stress     Stress: 0   Social Connections: Not on File (9/13/2024)    [facility]    Social Connections     Connectedness: 0   Intimate Partner Violence: Not on file   Housing Stability: Not on File (2019)    Received from [employer]    Housing Stability     Housin       No past surgical history on file.       Review of Systems   GENERAL: Negative  GI: Negative  MUSCULOSKELETAL: See HPI  SKIN: Negative  NEURO:  Negative     Physical Exam:    General/Constitutional: well appearing, no distress, appears stated age  HEENT: sclera clear  Respiratory: non labored breathing  Vascular: No edema, swelling or tenderness, except as noted in detailed exam.  Integumentary: No impressive skin lesions present, except as noted in detailed exam.  Neurological:  Alert and oriented   Psychological:  Normal mood and affect.  Musculoskeletal: Normal, except as noted in detailed exam and in HPI mild antalgic gait, unassisted    Left knee: Normal appearance, no swelling, no redness warmth.  There is a moderate joint effusion.  There is a palpable Baker's cyst in the popliteal fossa.  She does have some medial and lateral joint line tenderness to palpation, medial greater than lateral.,  There is some mild tenderness at the medial femoral condyle and medial proximal tibia.  Range of motion from 3 to 115 degrees with pain at end range of flexion.  No motor deficits present at the knee.  There is some pain with Mirtha testing.  Negative Lachman, negative posterior drawer.  Stable to varus and valgus stress.       Imaging: MRI of right knee was reviewed with patient including images, of note there is small subchondral insufficiency fracture of both the distal femur and medial tibia with some surrounding marrow edema, x-rays show more advanced medial compartment narrowing/chondral loss not well-appreciated on previous x-rays, there is a complex medial meniscus tear of the posterior horn and body, there is a large joint effusion with Baker's cyst.          Assessment   1. Insufficiency fracture of femur, left,  with routine healing, subsequent encounter        2. Arthritis of left knee  meloxicam (Mobic) 15 mg tablet              Plan: Left knee pain likely multifactorial in nature, MRI does show insufficiency fractures of the distal femur and proximal tibia, likely healing given subjective improvement in pain, only mild tenderness to palpation.  She does have more advanced medial compartment degenerative changes not well-appreciated on x-ray as well as a medial meniscus tear.  There is a residual joint effusion and Baker's cyst on exam.  Recommended conservative management, I did recommend that she continue to modify her activity, rest the knee to allow for continued healing of her insufficiency fracture.  She was given a prescription for meloxicam to assist in pain control.  I would like to see her back in 6 weeks for reassessment.

## 2025-02-26 ENCOUNTER — APPOINTMENT (OUTPATIENT)
Dept: PHYSICAL THERAPY | Facility: CLINIC | Age: 55
End: 2025-02-26
Payer: COMMERCIAL

## 2025-04-02 ENCOUNTER — APPOINTMENT (OUTPATIENT)
Dept: ORTHOPEDIC SURGERY | Facility: CLINIC | Age: 55
End: 2025-04-02
Payer: COMMERCIAL

## 2025-04-09 ENCOUNTER — OFFICE VISIT (OUTPATIENT)
Dept: ORTHOPEDIC SURGERY | Facility: CLINIC | Age: 55
End: 2025-04-09
Payer: COMMERCIAL

## 2025-04-09 DIAGNOSIS — M25.562 ACUTE PAIN OF LEFT KNEE: ICD-10-CM

## 2025-04-09 DIAGNOSIS — M17.12 ARTHRITIS OF LEFT KNEE: Primary | ICD-10-CM

## 2025-04-09 PROCEDURE — 99214 OFFICE O/P EST MOD 30 MIN: CPT | Performed by: FAMILY MEDICINE

## 2025-04-09 RX ORDER — MELOXICAM 15 MG/1
TABLET ORAL
Qty: 30 TABLET | Refills: 0 | Status: SHIPPED | OUTPATIENT
Start: 2025-04-09

## 2025-04-09 NOTE — PROGRESS NOTES
History of Present Illness   Chief Complaint   Patient presents with    Left Knee - Follow-up       The patient is 54 y.o. female with past medical history of bipolar schizophrenia here for follow-up of left knee pain.  Patient was last seen by me 6 weeks ago, see previous notes for full details.  Patient had been dealing with some intermittent knee pain over the past several months.  In late November she was treated with a knee aspiration and Kenalog injection for treatment of mild arthritis, she to follow back up with me as she had short-term response to injection with some worsening pain, she did have subsequent MRI obtained that showed more advanced medial degenerative changes as well as insufficiency fracture of the distal femur and proximal tibia medially.  I had reached out the patient and recommend that she minimize weightbearing, activity, at her most recent follow-up 6 weeks ago she was doing well with minimal residual tenderness at the medial knee, encouraged continued conservative management.  Over the past few weeks she has noticed some worsening pain, swelling in her knee exacerbated by walking, improved with rest.  She denies any new injuries or symptoms.  She was taken meloxicam but says she ran out.              No past medical history on file.    Medication Documentation Review Audit       Reviewed by Álvaro Choe MD (Physician) on 02/19/25 at 1139      Medication Order Taking? Sig Documenting Provider Last Dose Status   divalproex (Depakote ER) 500 mg 24 hr tablet [id number] No TAKE 2 TABLETS BY MOUTH NIGHTLY AT BEDTIME. Historical Provider, MD Taking Active   haloperidol (Haldol) 10 mg tablet [id number] No Take 1 tablet (10 mg) by mouth once daily. Historical Provider, MD Taking Active   meloxicam (Mobic) 15 mg tablet [id number]  Take 1 tablet (15 mg) by mouth once daily. Álvaro Choe MD  Active   methylPREDNISolone (Medrol Dospak) 4 mg tablets [id number]  Use as directed by package instructions  Thi ROGERS Detec, APRN-CNP  Active   risperiDONE (RisperDAL) 4 mg tablet [id number] No Take 1 tablet (4 mg) by mouth once daily. Historical Provider, MD Taking Active   sertraline (Zoloft) 100 mg tablet [id number] No Take 1.5 tablets (150 mg) by mouth once daily. Historical Provider, MD Taking Active                    Allergies   Allergen Reactions    Nickel Rash       Social History     Socioeconomic History    Marital status:      Spouse name: Not on file    Number of children: Not on file    Years of education: Not on file    Highest education level: Not on file   Occupational History    Not on file   Tobacco Use    Smoking status: Unknown    Smokeless tobacco: Not on file   Vaping Use    Vaping status: Never Used   Substance and Sexual Activity    Alcohol use: Never    Drug use: Never    Sexual activity: Not on file   Other Topics Concern    Not on file   Social History Narrative    Not on file     Social Drivers of Health     Financial Resource Strain: Not on File (2019)    Received from [facility]    Financial Resource Strain     Financial Resource Strain: 0   Food Insecurity: Not on File (2024)    Received from [facility]    Food Insecurity     Food: 0   Transportation Needs: Not on File (2019)    Received from [facility]    Transportation Needs     Transportation: 0   Physical Activity: Not on File (2019)    Received from [facility]    Physical Activity     Physical Activity: 0   Stress: Not on File (2019)    Received from [facility]    Stress     Stress: 0   Social Connections: Not on File (2024)    Received from [facility]    Social Connections     Connectedness: 0   Intimate Partner Violence: Not on file   Housing Stability: Not on File (2019)    Received from [facility]    Housing Stability     Housin       No past surgical history on file.       Review of Systems   GENERAL: Negative  GI: Negative  MUSCULOSKELETAL: See HPI  SKIN: Negative  NEURO:  Negative     Physical  Exam:    General/Constitutional: well appearing, no distress, appears stated age  HEENT: sclera clear  Respiratory: non labored breathing  Vascular: No edema, swelling or tenderness, except as noted in detailed exam.  Integumentary: No impressive skin lesions present, except as noted in detailed exam.  Neurological:  Alert and oriented   Psychological:  Normal mood and affect.  Musculoskeletal: Normal, except as noted in detailed exam and in HPI mild antalgic gait, unassisted    Left knee: Normal appearance, no swelling, no redness warmth.  There is a moderate joint effusion.  There is a palpable Baker's cyst in the popliteal fossa.  She does have some medial and lateral joint line tenderness to palpation, medial greater than lateral.,  No significant tenderness at the medial femoral condyle and medial proximal tibia.  Range of motion from 3 to 115 degrees with pain at end range of flexion.  No motor deficits present at the knee.  There is some pain with Mirtha testing.  Negative Lachman, negative posterior drawer.  Stable to varus and valgus stress.       Imaging: No new imaging today, previous MRI showed insufficiency fracture of the distal femur and medial tibia with more advanced degenerative changes in the medial compartment compared to mild changes seen on x-ray.  Also showed notable joint effusion with Baker's cyst.          Assessment   1. Arthritis of left knee        2. Acute pain of left knee  meloxicam (Mobic) 15 mg tablet                Plan: Patient with recurrent pain and left knee joint effusion, symptoms today thought to be secondary to underlying advanced osteoarthritis seen on MRI, did have insufficiency fracture seen however she has no significant bony tenderness on exam today, these are likely healed.  Discussed further workup and treatment.  She was interested in conservative management.  I did send a refill for meloxicam to assist in pain control, we discussed role of repeat  aspiration/injection but she would like to hold off on this for now.  We also discussed consideration of more definitive management with knee replacement surgery at some point given her degree of degenerative changes.  All questions and concerns were answered.  She will follow-up as symptoms dictate.

## 2025-04-21 ENCOUNTER — APPOINTMENT (OUTPATIENT)
Dept: PRIMARY CARE | Facility: CLINIC | Age: 55
End: 2025-04-21
Payer: COMMERCIAL